# Patient Record
(demographics unavailable — no encounter records)

---

## 2017-01-06 NOTE — DIAGNOSTIC IMAGING REPORT
[facility]

[address] [facility].[address] [address]

 

 

 

 

Report Submission Date: 2017 10:01:41 AM CST

Patient       Study

Name:   KISHA PHILLIPS       Date:   2017 8:54:03 AM CST

MRN:   [id number]       Modality Type:   CR

Gender:   O       Description:   LOWER EXTREMITY

:   69       Institution:   [facility]

Physician:   SADIA BISHOP            

 

 

Left knee - three views 

Clinical history:  Fall with injury.  Pain. 

Findings:  Examination of the left knee in AP, lateral and sunrise views 
demonstrates a vertical intra-articular fracture through the lateral tibial 
plateau with displacement of the fracture fragment by approximately 6 mm at the 
articular surface.  There is an additional fracture line extending obliquely 
through the proximal tibial shaft that is not as well defined.  The patella and 
femur appear intact as does the visualized proximal fibula. There is a joint 
effusion present with fat-fluid level. 

Impression: 

1.  Vertical intra-articular fracture of the lateral tibial plateau. 

2.  Additional oblique fracture in the proximal tibial shaft. 

3.  Lipohemarthrosis.

 

Electronically signed on 2017 10:01:41 AM CST by:

Ricky LE

## 2017-01-06 NOTE — ED PHYSICIAN DOCUMENTATION
Lower Extremity Injury





- HISTORIAN


Historian: patient





- HPI


Stated Complaint: Left Knee Injury


Chief Complaint: Lower Extremity Injury


Onset: minutes


Where: school


Context: fall


Associated Symptoms:: unable to bear weight


Modifying Factors:: pain on movement





- ROS


CONST: no problems


CVS/RESP: none


GI/: denies: nausea, vomiting


MS/SKIN/LYMPH: none


NEURO: denies: headache





- PAST HX


Past History: other (HTN, hypothyroidism)


Allergies/Adverse Reactions: 


 Allergies











Allergy/AdvReac Type Severity Reaction Status Date / Time


 


No Known Allergies Allergy   Verified 01/06/17 08:54














Home Medications: 


 Ambulatory Orders











 Medication  Instructions  Recorded


 


Lisinopril/Hctz 20-12.5 1 each PO DAILY  u2 06/29/15





[Zestoretic 20-12.5]  














- SOCIAL HX


Smoking History: non-smoker





- FAMILY HX


Family History: denies: none





- VITAL SIGNS


Vital Signs: 


 Vital Signs











Temp Pulse Resp BP Pulse Ox


 


 97 F L  60   18   122/60   98 


 


 01/06/17 08:40  01/06/17 08:40  01/06/17 08:40  01/06/17 08:40  01/06/17 08:40














- REVIEWED ASSESSMENTS


Nursing Assessment  Reviewed: Yes


Vitals Reviewed: Yes





Progress





- Progress


Progress: 





0955 Updated patient on xray results, recommended orthopedic consult.  Patient c

/o continued pain 7/10.





1005 Call to [facility] for othropedic consult





1027 Patient accepted by [facility]CIANO - patient to go to pre-op, keep NPO.  

Discussed immobilization - recommended propping on pillow, keep ice on knee for 

transfer. 





1030 Updated patient on plan of care, agrees with transfer.  





NPO since midnight.  patient did not eat breakfast. 





ED Results Lab/Radiology





- Orders


Orders: 


 ED Orders











 Category Date Time Status


 


 Place Saline Lock/IV NOW Care  01/06/17 08:43 Completed


 


 KNEE 3 VIEWS [RAD] Stat Exams  01/06/17 Completed


 


 TIBIA & FIBULA 2 VIEW [RAD] Stat Exams  01/06/17 Completed


 


 0.9 % Sodium Chloride [Normal Saline] 1,000 ml Med  01/06/17 10:26 Discontinued





 IV NOW   


 


 HYDROmorphone HCL/PF [Dilaudid] Med  01/06/17 10:16 Discontinued





 1 mg .ROUTE .STK-MED ONE   


 


 HYDROmorphone HCL/PF [Dilaudid] Med  01/06/17 10:22 Discontinued





 1 mg IVP NOW ONE   


 


 Ketorolac Tromethamine [Toradol] Med  01/06/17 10:15 Discontinued





 60 mg .ROUTE .STK-MED ONE   


 


 Ketorolac Tromethamine [Toradol] Med  01/06/17 10:14 Discontinued





 60 mg IM NOW ONE   


 


 Ondansetron HCl/Pf [Zofran 4 mg/2 ml] Med  01/06/17 10:44 Once





 4 mg IVP NOW ONE   


 


 fentaNYL CITRATE/PF [Duragesic] Med  01/06/17 08:42 Discontinued





 100 mcg .ROUTE .STK-MED ONE   


 


 fentaNYL CITRATE/PF [Duragesic] Med  01/06/17 08:43 Discontinued





 50 mcg IVP NOW ONE   


 


 fentaNYL CITRATE/PF [Duragesic] Med  01/06/17 09:38 Discontinued





 50 mcg IVP NOW ONE   














Lower Extremities Injury Phy





- Physical Exam


General Appearance: severe distress


Hips: bilateral hip: non-tender, normal inspection, normal range of motion, no 

evidence of injury


Legs: bilateral: non-tender, normal inspection, normal range of motion, no 

evidence of injury


Knees: right: non-tender, normal inspection, normal range of motion, no 

evidence of injury, left: bone tenderness, deformity (obvious lateral knee 

deformity), pain


Ankle: bilateral: non-tender, normal inspection, normal range of motion, no 

evidence of injury


Foot: bilateral foot: non-tender, normal inspection, normal range of motion, no 

evidence of injury


Gait: gait not tested d/t pain (and deformity)


Neuro/Vascular/Tendon: no vascular compromise, motor nml, sensation nml, ROM nml


Resp/CVS: chest non-tender, breath sounds nml, heart sounds nml, no resp. 

distress, lungs clear, reg. rate & rhythm


Abdomen: non-tender, pelvis stable





Discharge


Clincal Impression: 


Tibial plateau fracture, left


Qualifiers:


 Encounter type: initial encounter Fracture type: closed Qualified Code(s): 

S82.142A - Displaced bicondylar fracture of left tibia, initial encounter for 

closed fracture





Home Medications: 


Ambulatory Orders





Lisinopril/Hctz 20-12.5 [Zestoretic 20-12.5] 1 each PO DAILY  u2 06/29/15 








Condition: Fair


Disposition: 02 XFER [facility] HOS[facility]


Decision to Admit: NO


Decision Time: 10:47

## 2017-01-06 NOTE — DIAGNOSTIC IMAGING REPORT
[facility]

[address] [facility].[address] [address]

 

 

 

 

Report Submission Date: 2017 10:03:33 AM CST

Patient       Study

Name:   KISHA PHILLIPS       Date:   2017 8:52:31 AM CST

MRN:   [id number]       Modality Type:   CR

Gender:   O       Description:   LOWER EXTREMITY

:   69       Institution:   [facility]

Physician:   SADIA BISHOP            

 

 

Left tibia and fibula - two views 

Clinical history:  Fall off of a ladder.  Pain. 

Findings:  Examination of the left tibia and fibula in AP and lateral views 
demonstrates again a vertical intra-articular fracture of the lateral tibial 
plateau.  The distal tibial shaft and fibula appear intact.  The ankle mortise 
appears anatomic on these images. 

Impression: 

1.  Vertical intra-articular fracture of the lateral tibial plateau.

 

Electronically signed on 2017 10:03:33 AM CST by:

Ricky LE